# Patient Record
Sex: FEMALE | Race: WHITE | NOT HISPANIC OR LATINO | Employment: OTHER | URBAN - METROPOLITAN AREA
[De-identification: names, ages, dates, MRNs, and addresses within clinical notes are randomized per-mention and may not be internally consistent; named-entity substitution may affect disease eponyms.]

---

## 2017-01-01 ENCOUNTER — TELEPHONE (OUTPATIENT)
Dept: FAMILY MEDICINE | Facility: CLINIC | Age: 77
End: 2017-01-01

## 2017-01-01 ENCOUNTER — LAB VISIT (OUTPATIENT)
Dept: LAB | Facility: HOSPITAL | Age: 77
End: 2017-01-01
Attending: INTERNAL MEDICINE
Payer: MEDICARE

## 2017-01-01 ENCOUNTER — TELEPHONE (OUTPATIENT)
Dept: INFUSION THERAPY | Facility: HOSPITAL | Age: 77
End: 2017-01-01

## 2017-01-01 ENCOUNTER — OFFICE VISIT (OUTPATIENT)
Dept: FAMILY MEDICINE | Facility: CLINIC | Age: 77
End: 2017-01-01
Payer: MEDICARE

## 2017-01-01 ENCOUNTER — TELEPHONE (OUTPATIENT)
Dept: HEMATOLOGY/ONCOLOGY | Facility: CLINIC | Age: 77
End: 2017-01-01

## 2017-01-01 ENCOUNTER — INFUSION (OUTPATIENT)
Dept: INFUSION THERAPY | Facility: HOSPITAL | Age: 77
End: 2017-01-01
Attending: INTERNAL MEDICINE
Payer: MEDICARE

## 2017-01-01 ENCOUNTER — LAB VISIT (OUTPATIENT)
Dept: LAB | Facility: HOSPITAL | Age: 77
End: 2017-01-01
Attending: PHYSICIAN ASSISTANT
Payer: MEDICARE

## 2017-01-01 ENCOUNTER — DOCUMENTATION ONLY (OUTPATIENT)
Dept: INFUSION THERAPY | Facility: HOSPITAL | Age: 77
End: 2017-01-01

## 2017-01-01 ENCOUNTER — DOCUMENTATION ONLY (OUTPATIENT)
Dept: ADMINISTRATIVE | Facility: HOSPITAL | Age: 77
End: 2017-01-01

## 2017-01-01 VITALS
BODY MASS INDEX: 22.2 KG/M2 | WEIGHT: 130 LBS | SYSTOLIC BLOOD PRESSURE: 107 MMHG | DIASTOLIC BLOOD PRESSURE: 65 MMHG | HEART RATE: 97 BPM | HEIGHT: 64 IN | RESPIRATION RATE: 16 BRPM | TEMPERATURE: 97 F

## 2017-01-01 VITALS
HEART RATE: 102 BPM | SYSTOLIC BLOOD PRESSURE: 104 MMHG | DIASTOLIC BLOOD PRESSURE: 59 MMHG | WEIGHT: 125.63 LBS | HEIGHT: 65 IN | BODY MASS INDEX: 20.93 KG/M2 | TEMPERATURE: 98 F | RESPIRATION RATE: 20 BRPM

## 2017-01-01 VITALS
SYSTOLIC BLOOD PRESSURE: 102 MMHG | DIASTOLIC BLOOD PRESSURE: 58 MMHG | RESPIRATION RATE: 16 BRPM | OXYGEN SATURATION: 98 % | WEIGHT: 129.75 LBS | HEART RATE: 100 BPM | BODY MASS INDEX: 21.62 KG/M2 | TEMPERATURE: 98 F | HEIGHT: 65 IN

## 2017-01-01 VITALS
DIASTOLIC BLOOD PRESSURE: 72 MMHG | OXYGEN SATURATION: 96 % | TEMPERATURE: 99 F | BODY MASS INDEX: 21.72 KG/M2 | SYSTOLIC BLOOD PRESSURE: 118 MMHG | RESPIRATION RATE: 20 BRPM | HEIGHT: 66 IN | WEIGHT: 135.13 LBS | HEART RATE: 102 BPM

## 2017-01-01 VITALS
OXYGEN SATURATION: 96 % | WEIGHT: 130.81 LBS | HEART RATE: 95 BPM | DIASTOLIC BLOOD PRESSURE: 72 MMHG | HEIGHT: 64 IN | BODY MASS INDEX: 22.33 KG/M2 | SYSTOLIC BLOOD PRESSURE: 112 MMHG | TEMPERATURE: 98 F

## 2017-01-01 VITALS
HEART RATE: 84 BPM | SYSTOLIC BLOOD PRESSURE: 112 MMHG | BODY MASS INDEX: 20.75 KG/M2 | RESPIRATION RATE: 17 BRPM | DIASTOLIC BLOOD PRESSURE: 78 MMHG | HEIGHT: 68 IN | OXYGEN SATURATION: 98 % | WEIGHT: 136.88 LBS | TEMPERATURE: 98 F

## 2017-01-01 DIAGNOSIS — C50.912: Primary | ICD-10-CM

## 2017-01-01 DIAGNOSIS — C50.919 BREAST CARCINOMA METASTATIC, PLEURA, UNSPECIFIED LATERALITY: ICD-10-CM

## 2017-01-01 DIAGNOSIS — C50.919 BREAST CARCINOMA METASTATIC, PLEURA, UNSPECIFIED LATERALITY: Primary | ICD-10-CM

## 2017-01-01 DIAGNOSIS — C78.2 BREAST CARCINOMA METASTATIC, PLEURA, UNSPECIFIED LATERALITY: ICD-10-CM

## 2017-01-01 DIAGNOSIS — D53.9 MACROCYTIC ANEMIA: ICD-10-CM

## 2017-01-01 DIAGNOSIS — C78.2 BREAST CARCINOMA METASTATIC, PLEURA, UNSPECIFIED LATERALITY: Primary | ICD-10-CM

## 2017-01-01 DIAGNOSIS — R53.83 FATIGUE, UNSPECIFIED TYPE: ICD-10-CM

## 2017-01-01 DIAGNOSIS — J18.9 PNEUMONIA OF LEFT LOWER LOBE DUE TO INFECTIOUS ORGANISM: Primary | ICD-10-CM

## 2017-01-01 DIAGNOSIS — C78.2: Primary | ICD-10-CM

## 2017-01-01 DIAGNOSIS — R05.9 COUGH: ICD-10-CM

## 2017-01-01 DIAGNOSIS — R39.9 UTI SYMPTOMS: Primary | ICD-10-CM

## 2017-01-01 DIAGNOSIS — J40 BRONCHITIS, NOT SPECIFIED AS ACUTE OR CHRONIC: Primary | ICD-10-CM

## 2017-01-01 DIAGNOSIS — E53.8 B12 DEFICIENCY: ICD-10-CM

## 2017-01-01 DIAGNOSIS — J18.9 PNEUMONIA OF LEFT LOWER LOBE DUE TO INFECTIOUS ORGANISM: ICD-10-CM

## 2017-01-01 DIAGNOSIS — J01.00 ACUTE MAXILLARY SINUSITIS, RECURRENCE NOT SPECIFIED: Primary | ICD-10-CM

## 2017-01-01 DIAGNOSIS — R59.0 MEDIASTINAL LYMPHADENOPATHY: ICD-10-CM

## 2017-01-01 LAB
ALBUMIN SERPL BCP-MCNC: 3.1 G/DL
ALBUMIN SERPL BCP-MCNC: 3.2 G/DL
ALBUMIN SERPL BCP-MCNC: 3.2 G/DL
ALBUMIN SERPL BCP-MCNC: 3.3 G/DL
ALBUMIN SERPL BCP-MCNC: 3.7 G/DL
ALBUMIN SERPL BCP-MCNC: 3.8 G/DL
ALBUMIN SERPL BCP-MCNC: 3.9 G/DL
ALBUMIN SERPL BCP-MCNC: 3.9 G/DL
ALBUMIN SERPL BCP-MCNC: 4 G/DL
ALBUMIN SERPL BCP-MCNC: 4.2 G/DL
ALBUMIN SERPL BCP-MCNC: 4.2 G/DL
ALP SERPL-CCNC: 60 U/L
ALP SERPL-CCNC: 63 U/L
ALP SERPL-CCNC: 65 U/L
ALP SERPL-CCNC: 65 U/L
ALP SERPL-CCNC: 67 U/L
ALP SERPL-CCNC: 68 U/L
ALP SERPL-CCNC: 68 U/L
ALP SERPL-CCNC: 69 U/L
ALP SERPL-CCNC: 72 U/L
ALP SERPL-CCNC: 73 U/L
ALP SERPL-CCNC: 74 U/L
ALT SERPL W/O P-5'-P-CCNC: 20 U/L
ALT SERPL W/O P-5'-P-CCNC: 22 U/L
ALT SERPL W/O P-5'-P-CCNC: 23 U/L
ALT SERPL W/O P-5'-P-CCNC: 23 U/L
ALT SERPL W/O P-5'-P-CCNC: 25 U/L
ALT SERPL W/O P-5'-P-CCNC: 27 U/L
ALT SERPL W/O P-5'-P-CCNC: 28 U/L
ALT SERPL W/O P-5'-P-CCNC: 28 U/L
ALT SERPL W/O P-5'-P-CCNC: 29 U/L
ALT SERPL W/O P-5'-P-CCNC: 31 U/L
ALT SERPL W/O P-5'-P-CCNC: 44 U/L
AMYLASE SERPL-CCNC: 76 U/L
ANION GAP SERPL CALC-SCNC: 10 MMOL/L
ANION GAP SERPL CALC-SCNC: 10 MMOL/L
ANION GAP SERPL CALC-SCNC: 5 MMOL/L
ANION GAP SERPL CALC-SCNC: 6 MMOL/L
ANION GAP SERPL CALC-SCNC: 6 MMOL/L
ANION GAP SERPL CALC-SCNC: 7 MMOL/L
ANION GAP SERPL CALC-SCNC: 8 MMOL/L
ANION GAP SERPL CALC-SCNC: 9 MMOL/L
ANISOCYTOSIS BLD QL SMEAR: SLIGHT
AST SERPL-CCNC: 32 U/L
AST SERPL-CCNC: 36 U/L
AST SERPL-CCNC: 38 U/L
AST SERPL-CCNC: 38 U/L
AST SERPL-CCNC: 39 U/L
AST SERPL-CCNC: 40 U/L
AST SERPL-CCNC: 41 U/L
AST SERPL-CCNC: 41 U/L
AST SERPL-CCNC: 42 U/L
AST SERPL-CCNC: 45 U/L
AST SERPL-CCNC: 51 U/L
BASOPHILS # BLD AUTO: 0.04 K/UL
BASOPHILS # BLD AUTO: 0.05 K/UL
BASOPHILS # BLD AUTO: 0.06 K/UL
BASOPHILS # BLD AUTO: 0.07 K/UL
BASOPHILS # BLD AUTO: 0.07 K/UL
BASOPHILS # BLD AUTO: 0.09 K/UL
BASOPHILS NFR BLD: 0 %
BASOPHILS NFR BLD: 0.4 %
BASOPHILS NFR BLD: 0.5 %
BASOPHILS NFR BLD: 1.2 %
BASOPHILS NFR BLD: 2 %
BASOPHILS NFR BLD: 2 %
BASOPHILS NFR BLD: 2.2 %
BASOPHILS NFR BLD: 2.3 %
BASOPHILS NFR BLD: 2.8 %
BILIRUB SERPL-MCNC: 0.3 MG/DL
BILIRUB SERPL-MCNC: 0.4 MG/DL
BILIRUB SERPL-MCNC: 0.5 MG/DL
BILIRUB SERPL-MCNC: 0.5 MG/DL
BILIRUB SERPL-MCNC: 0.6 MG/DL
BILIRUB SERPL-MCNC: 0.7 MG/DL
BILIRUB SERPL-MCNC: NORMAL MG/DL
BLOOD URINE, POC: NORMAL
BUN SERPL-MCNC: 10 MG/DL
BUN SERPL-MCNC: 10 MG/DL
BUN SERPL-MCNC: 11 MG/DL
BUN SERPL-MCNC: 12 MG/DL
BUN SERPL-MCNC: 13 MG/DL
BUN SERPL-MCNC: 16 MG/DL
BUN SERPL-MCNC: 16 MG/DL
BUN SERPL-MCNC: 19 MG/DL
BUN SERPL-MCNC: 8 MG/DL
CALCIUM SERPL-MCNC: 8.4 MG/DL
CALCIUM SERPL-MCNC: 8.5 MG/DL
CALCIUM SERPL-MCNC: 8.5 MG/DL
CALCIUM SERPL-MCNC: 9 MG/DL
CALCIUM SERPL-MCNC: 9 MG/DL
CALCIUM SERPL-MCNC: 9.2 MG/DL
CALCIUM SERPL-MCNC: 9.2 MG/DL
CALCIUM SERPL-MCNC: 9.4 MG/DL
CALCIUM SERPL-MCNC: 9.4 MG/DL
CALCIUM SERPL-MCNC: 9.5 MG/DL
CALCIUM SERPL-MCNC: 9.5 MG/DL
CANCER AG15-3 SERPL-ACNC: 464 U/ML
CANCER AG27-29 SERPL-ACNC: 382 U/ML
CEA SERPL-MCNC: 121 NG/ML
CHLORIDE SERPL-SCNC: 100 MMOL/L
CHLORIDE SERPL-SCNC: 101 MMOL/L
CHLORIDE SERPL-SCNC: 102 MMOL/L
CHLORIDE SERPL-SCNC: 103 MMOL/L
CHLORIDE SERPL-SCNC: 95 MMOL/L
CHLORIDE SERPL-SCNC: 95 MMOL/L
CHLORIDE SERPL-SCNC: 96 MMOL/L
CHLORIDE SERPL-SCNC: 96 MMOL/L
CHLORIDE SERPL-SCNC: 98 MMOL/L
CHLORIDE SERPL-SCNC: 98 MMOL/L
CHLORIDE SERPL-SCNC: 99 MMOL/L
CO2 SERPL-SCNC: 27 MMOL/L
CO2 SERPL-SCNC: 29 MMOL/L
CO2 SERPL-SCNC: 30 MMOL/L
CO2 SERPL-SCNC: 30 MMOL/L
CO2 SERPL-SCNC: 31 MMOL/L
CO2 SERPL-SCNC: 32 MMOL/L
COLOR, POC UA: YELLOW
CREAT SERPL-MCNC: 0.64 MG/DL
CREAT SERPL-MCNC: 0.64 MG/DL
CREAT SERPL-MCNC: 0.65 MG/DL
CREAT SERPL-MCNC: 0.66 MG/DL
CREAT SERPL-MCNC: 0.67 MG/DL
CREAT SERPL-MCNC: 0.69 MG/DL
CREAT SERPL-MCNC: 0.7 MG/DL
CREAT SERPL-MCNC: 0.7 MG/DL
CREAT SERPL-MCNC: 0.71 MG/DL
CREAT SERPL-MCNC: 0.73 MG/DL
CREAT SERPL-MCNC: 0.8 MG/DL
DIFFERENTIAL METHOD: ABNORMAL
EOSINOPHIL # BLD AUTO: 0 K/UL
EOSINOPHIL # BLD AUTO: 0.1 K/UL
EOSINOPHIL NFR BLD: 0 %
EOSINOPHIL NFR BLD: 0.3 %
EOSINOPHIL NFR BLD: 0.3 %
EOSINOPHIL NFR BLD: 0.4 %
EOSINOPHIL NFR BLD: 0.6 %
EOSINOPHIL NFR BLD: 0.7 %
EOSINOPHIL NFR BLD: 0.9 %
EOSINOPHIL NFR BLD: 1 %
EOSINOPHIL NFR BLD: 1 %
ERYTHROCYTE [DISTWIDTH] IN BLOOD BY AUTOMATED COUNT: 14 %
ERYTHROCYTE [DISTWIDTH] IN BLOOD BY AUTOMATED COUNT: 14.1 %
ERYTHROCYTE [DISTWIDTH] IN BLOOD BY AUTOMATED COUNT: 14.2 %
ERYTHROCYTE [DISTWIDTH] IN BLOOD BY AUTOMATED COUNT: 14.6 %
ERYTHROCYTE [DISTWIDTH] IN BLOOD BY AUTOMATED COUNT: 14.9 %
ERYTHROCYTE [DISTWIDTH] IN BLOOD BY AUTOMATED COUNT: 15.4 %
ERYTHROCYTE [DISTWIDTH] IN BLOOD BY AUTOMATED COUNT: 15.6 %
ERYTHROCYTE [DISTWIDTH] IN BLOOD BY AUTOMATED COUNT: 16.3 %
ERYTHROCYTE [DISTWIDTH] IN BLOOD BY AUTOMATED COUNT: 17.8 %
EST. GFR  (AFRICAN AMERICAN): >60 ML/MIN/1.73 M^2
EST. GFR  (NON AFRICAN AMERICAN): >60 ML/MIN/1.73 M^2
FOLATE SERPL-MCNC: 17.3 NG/ML
GIANT PLATELETS BLD QL SMEAR: PRESENT
GIANT PLATELETS BLD QL SMEAR: PRESENT
GLUCOSE SERPL-MCNC: 100 MG/DL
GLUCOSE SERPL-MCNC: 100 MG/DL
GLUCOSE SERPL-MCNC: 103 MG/DL
GLUCOSE SERPL-MCNC: 105 MG/DL
GLUCOSE SERPL-MCNC: 105 MG/DL
GLUCOSE SERPL-MCNC: 107 MG/DL
GLUCOSE SERPL-MCNC: 112 MG/DL
GLUCOSE SERPL-MCNC: 77 MG/DL
GLUCOSE SERPL-MCNC: 87 MG/DL
GLUCOSE SERPL-MCNC: 87 MG/DL
GLUCOSE SERPL-MCNC: 90 MG/DL
GLUCOSE UR QL STRIP: NORMAL
HCT VFR BLD AUTO: 29.6 %
HCT VFR BLD AUTO: 31 %
HCT VFR BLD AUTO: 31 %
HCT VFR BLD AUTO: 32.5 %
HCT VFR BLD AUTO: 32.5 %
HCT VFR BLD AUTO: 33 %
HCT VFR BLD AUTO: 36.5 %
HCT VFR BLD AUTO: 36.6 %
HCT VFR BLD AUTO: 37.6 %
HGB BLD-MCNC: 10.2 G/DL
HGB BLD-MCNC: 10.5 G/DL
HGB BLD-MCNC: 10.5 G/DL
HGB BLD-MCNC: 10.6 G/DL
HGB BLD-MCNC: 11.5 G/DL
HGB BLD-MCNC: 11.7 G/DL
HGB BLD-MCNC: 11.9 G/DL
HGB BLD-MCNC: 9.6 G/DL
HGB BLD-MCNC: 9.9 G/DL
HYPOCHROMIA BLD QL SMEAR: ABNORMAL
IGA SERPL-MCNC: 270 MG/DL
IGG SERPL-MCNC: 734 MG/DL
IGM SERPL-MCNC: 49 MG/DL
KETONES UR QL STRIP: NORMAL
LEUKOCYTE ESTERASE URINE, POC: NORMAL
LIPASE SERPL-CCNC: 113 U/L
LYMPHOCYTES # BLD AUTO: 0.5 K/UL
LYMPHOCYTES # BLD AUTO: 0.6 K/UL
LYMPHOCYTES # BLD AUTO: 0.6 K/UL
LYMPHOCYTES # BLD AUTO: 1 K/UL
LYMPHOCYTES # BLD AUTO: 1.3 K/UL
LYMPHOCYTES # BLD AUTO: 1.6 K/UL
LYMPHOCYTES NFR BLD: 11 %
LYMPHOCYTES NFR BLD: 12.2 %
LYMPHOCYTES NFR BLD: 13 %
LYMPHOCYTES NFR BLD: 15.4 %
LYMPHOCYTES NFR BLD: 17.8 %
LYMPHOCYTES NFR BLD: 19.6 %
LYMPHOCYTES NFR BLD: 23 %
LYMPHOCYTES NFR BLD: 23.1 %
LYMPHOCYTES NFR BLD: 35 %
MAGNESIUM SERPL-MCNC: 2 MG/DL
MAGNESIUM SERPL-MCNC: 2.1 MG/DL
MAGNESIUM SERPL-MCNC: 2.1 MG/DL
MAGNESIUM SERPL-MCNC: 2.2 MG/DL
MAGNESIUM SERPL-MCNC: 2.3 MG/DL
MCH RBC QN AUTO: 32.9 PG
MCH RBC QN AUTO: 33 PG
MCH RBC QN AUTO: 33.1 PG
MCH RBC QN AUTO: 33.1 PG
MCH RBC QN AUTO: 33.8 PG
MCH RBC QN AUTO: 34.3 PG
MCH RBC QN AUTO: 37.7 PG
MCH RBC QN AUTO: 38 PG
MCH RBC QN AUTO: 38.5 PG
MCHC RBC AUTO-ENTMCNC: 31.5 %
MCHC RBC AUTO-ENTMCNC: 31.6 %
MCHC RBC AUTO-ENTMCNC: 31.8 %
MCHC RBC AUTO-ENTMCNC: 31.9 %
MCHC RBC AUTO-ENTMCNC: 32 %
MCHC RBC AUTO-ENTMCNC: 32.3 %
MCHC RBC AUTO-ENTMCNC: 32.4 %
MCHC RBC AUTO-ENTMCNC: 32.6 %
MCHC RBC AUTO-ENTMCNC: 32.9 %
MCV RBC AUTO: 102 FL
MCV RBC AUTO: 103 FL
MCV RBC AUTO: 104 FL
MCV RBC AUTO: 105 FL
MCV RBC AUTO: 107 FL
MCV RBC AUTO: 107 FL
MCV RBC AUTO: 116 FL
MCV RBC AUTO: 117 FL
MCV RBC AUTO: 118 FL
METHYLMALONATE SERPL-SCNC: 0.17 UMOL/L
MONOCYTES # BLD AUTO: 0.2 K/UL
MONOCYTES # BLD AUTO: 0.3 K/UL
MONOCYTES # BLD AUTO: 0.4 K/UL
MONOCYTES # BLD AUTO: 1 K/UL
MONOCYTES # BLD AUTO: 1.1 K/UL
MONOCYTES # BLD AUTO: 1.4 K/UL
MONOCYTES NFR BLD: 13.1 %
MONOCYTES NFR BLD: 13.3 %
MONOCYTES NFR BLD: 13.4 %
MONOCYTES NFR BLD: 4 %
MONOCYTES NFR BLD: 4 %
MONOCYTES NFR BLD: 5 %
MONOCYTES NFR BLD: 9 %
MONOCYTES NFR BLD: 9.9 %
MONOCYTES NFR BLD: 9.9 %
NEUTROPHILS # BLD AUTO: 1.2 K/UL
NEUTROPHILS # BLD AUTO: 1.3 K/UL
NEUTROPHILS # BLD AUTO: 1.4 K/UL
NEUTROPHILS # BLD AUTO: 2.1 K/UL
NEUTROPHILS # BLD AUTO: 2.1 K/UL
NEUTROPHILS # BLD AUTO: 5 K/UL
NEUTROPHILS # BLD AUTO: 5.3 K/UL
NEUTROPHILS # BLD AUTO: 7.3 K/UL
NEUTROPHILS # BLD AUTO: 8.4 K/UL
NEUTROPHILS NFR BLD: 59 %
NEUTROPHILS NFR BLD: 63.3 %
NEUTROPHILS NFR BLD: 66.6 %
NEUTROPHILS NFR BLD: 68.4 %
NEUTROPHILS NFR BLD: 69 %
NEUTROPHILS NFR BLD: 70.6 %
NEUTROPHILS NFR BLD: 72 %
NEUTROPHILS NFR BLD: 76.8 %
NEUTROPHILS NFR BLD: 84 %
NITRITE, POC UA: NORMAL
NRBC BLD-RTO: 0 /100 WBC
OVALOCYTES BLD QL SMEAR: ABNORMAL
OVALOCYTES BLD QL SMEAR: ABNORMAL
PH, POC UA: 6
PLATELET # BLD AUTO: 154 K/UL
PLATELET # BLD AUTO: 221 K/UL
PLATELET # BLD AUTO: 226 K/UL
PLATELET # BLD AUTO: 298 K/UL
PLATELET # BLD AUTO: 303 K/UL
PLATELET # BLD AUTO: 318 K/UL
PLATELET # BLD AUTO: 327 K/UL
PLATELET # BLD AUTO: 339 K/UL
PLATELET # BLD AUTO: 364 K/UL
PLATELET BLD QL SMEAR: ABNORMAL
PMV BLD AUTO: 10.1 FL
PMV BLD AUTO: 10.3 FL
PMV BLD AUTO: 8.8 FL
PMV BLD AUTO: 9.1 FL
PMV BLD AUTO: 9.1 FL
PMV BLD AUTO: 9.2 FL
PMV BLD AUTO: 9.3 FL
PMV BLD AUTO: 9.5 FL
PMV BLD AUTO: 9.7 FL
POIKILOCYTOSIS BLD QL SMEAR: SLIGHT
POIKILOCYTOSIS BLD QL SMEAR: SLIGHT
POLYCHROMASIA BLD QL SMEAR: ABNORMAL
POTASSIUM SERPL-SCNC: 3 MMOL/L
POTASSIUM SERPL-SCNC: 3.3 MMOL/L
POTASSIUM SERPL-SCNC: 3.4 MMOL/L
POTASSIUM SERPL-SCNC: 3.6 MMOL/L
POTASSIUM SERPL-SCNC: 3.8 MMOL/L
POTASSIUM SERPL-SCNC: 3.9 MMOL/L
POTASSIUM SERPL-SCNC: 3.9 MMOL/L
POTASSIUM SERPL-SCNC: 4 MMOL/L
POTASSIUM SERPL-SCNC: 4 MMOL/L
POTASSIUM SERPL-SCNC: 4.1 MMOL/L
POTASSIUM SERPL-SCNC: 4.4 MMOL/L
PROT SERPL-MCNC: 5.8 G/DL
PROT SERPL-MCNC: 5.9 G/DL
PROT SERPL-MCNC: 5.9 G/DL
PROT SERPL-MCNC: 6 G/DL
PROT SERPL-MCNC: 6.4 G/DL
PROT SERPL-MCNC: 6.6 G/DL
PROT SERPL-MCNC: 6.8 G/DL
PROT SERPL-MCNC: 6.8 G/DL
PROT SERPL-MCNC: 7 G/DL
PROT SERPL-MCNC: 7.3 G/DL
PROT SERPL-MCNC: 7.4 G/DL
PROTEIN, POC: NORMAL
RBC # BLD AUTO: 2.65 M/UL
RBC # BLD AUTO: 2.76 M/UL
RBC # BLD AUTO: 2.81 M/UL
RBC # BLD AUTO: 2.91 M/UL
RBC # BLD AUTO: 2.99 M/UL
RBC # BLD AUTO: 3.19 M/UL
RBC # BLD AUTO: 3.41 M/UL
RBC # BLD AUTO: 3.47 M/UL
RBC # BLD AUTO: 3.52 M/UL
SODIUM SERPL-SCNC: 132 MMOL/L
SODIUM SERPL-SCNC: 133 MMOL/L
SODIUM SERPL-SCNC: 134 MMOL/L
SODIUM SERPL-SCNC: 137 MMOL/L
SODIUM SERPL-SCNC: 137 MMOL/L
SODIUM SERPL-SCNC: 139 MMOL/L
SODIUM SERPL-SCNC: 139 MMOL/L
SODIUM SERPL-SCNC: 141 MMOL/L
SODIUM SERPL-SCNC: 141 MMOL/L
SPECIFIC GRAVITY, POC UA: 1.01
UROBILINOGEN, POC UA: NORMAL
VIT B12 SERPL-MCNC: 813 PG/ML
WBC # BLD AUTO: 1.96 K/UL
WBC # BLD AUTO: 1.97 K/UL
WBC # BLD AUTO: 10.35 K/UL
WBC # BLD AUTO: 10.89 K/UL
WBC # BLD AUTO: 2.12 K/UL
WBC # BLD AUTO: 3.01 K/UL
WBC # BLD AUTO: 3.2 K/UL
WBC # BLD AUTO: 5.97 K/UL
WBC # BLD AUTO: 7.37 K/UL

## 2017-01-01 PROCEDURE — 80053 COMPREHEN METABOLIC PANEL: CPT

## 2017-01-01 PROCEDURE — 83735 ASSAY OF MAGNESIUM: CPT | Mod: PN

## 2017-01-01 PROCEDURE — 96372 THER/PROPH/DIAG INJ SC/IM: CPT | Mod: PN

## 2017-01-01 PROCEDURE — 85025 COMPLETE CBC W/AUTO DIFF WBC: CPT

## 2017-01-01 PROCEDURE — 36415 COLL VENOUS BLD VENIPUNCTURE: CPT | Mod: PN

## 2017-01-01 PROCEDURE — 1126F AMNT PAIN NOTED NONE PRSNT: CPT | Mod: S$GLB,,, | Performed by: NURSE PRACTITIONER

## 2017-01-01 PROCEDURE — 86300 IMMUNOASSAY TUMOR CA 15-3: CPT | Mod: PN

## 2017-01-01 PROCEDURE — 85025 COMPLETE CBC W/AUTO DIFF WBC: CPT | Mod: PN

## 2017-01-01 PROCEDURE — 85027 COMPLETE CBC AUTOMATED: CPT

## 2017-01-01 PROCEDURE — 82378 CARCINOEMBRYONIC ANTIGEN: CPT

## 2017-01-01 PROCEDURE — 1159F MED LIST DOCD IN RCRD: CPT | Mod: S$GLB,,, | Performed by: NURSE PRACTITIONER

## 2017-01-01 PROCEDURE — 85007 BL SMEAR W/DIFF WBC COUNT: CPT | Mod: PN

## 2017-01-01 PROCEDURE — 80053 COMPREHEN METABOLIC PANEL: CPT | Mod: PN

## 2017-01-01 PROCEDURE — 83735 ASSAY OF MAGNESIUM: CPT

## 2017-01-01 PROCEDURE — 96402 CHEMO HORMON ANTINEOPL SQ/IM: CPT | Mod: PN

## 2017-01-01 PROCEDURE — 99214 OFFICE O/P EST MOD 30 MIN: CPT | Mod: S$GLB,,, | Performed by: NURSE PRACTITIONER

## 2017-01-01 PROCEDURE — 96372 THER/PROPH/DIAG INJ SC/IM: CPT | Mod: S$GLB,,, | Performed by: NURSE PRACTITIONER

## 2017-01-01 PROCEDURE — 82784 ASSAY IGA/IGD/IGG/IGM EACH: CPT | Mod: 59

## 2017-01-01 PROCEDURE — 63600175 PHARM REV CODE 636 W HCPCS: Mod: PN | Performed by: INTERNAL MEDICINE

## 2017-01-01 PROCEDURE — 1160F RVW MEDS BY RX/DR IN RCRD: CPT | Mod: S$GLB,,, | Performed by: NURSE PRACTITIONER

## 2017-01-01 PROCEDURE — 99214 OFFICE O/P EST MOD 30 MIN: CPT | Mod: 25,S$GLB,, | Performed by: NURSE PRACTITIONER

## 2017-01-01 PROCEDURE — 1157F ADVNC CARE PLAN IN RCRD: CPT | Mod: S$GLB,,, | Performed by: NURSE PRACTITIONER

## 2017-01-01 PROCEDURE — 83690 ASSAY OF LIPASE: CPT | Mod: PN

## 2017-01-01 PROCEDURE — 82150 ASSAY OF AMYLASE: CPT | Mod: PN

## 2017-01-01 PROCEDURE — 82150 ASSAY OF AMYLASE: CPT

## 2017-01-01 PROCEDURE — 85007 BL SMEAR W/DIFF WBC COUNT: CPT

## 2017-01-01 PROCEDURE — 82378 CARCINOEMBRYONIC ANTIGEN: CPT | Mod: PN

## 2017-01-01 PROCEDURE — 80053 COMPREHEN METABOLIC PANEL: CPT | Mod: 91

## 2017-01-01 PROCEDURE — 85027 COMPLETE CBC AUTOMATED: CPT | Mod: PN

## 2017-01-01 PROCEDURE — 63600175 PHARM REV CODE 636 W HCPCS: Mod: PN | Performed by: NURSE PRACTITIONER

## 2017-01-01 PROCEDURE — 99499 UNLISTED E&M SERVICE: CPT | Mod: S$GLB,,, | Performed by: NURSE PRACTITIONER

## 2017-01-01 PROCEDURE — 82607 VITAMIN B-12: CPT | Mod: PN

## 2017-01-01 PROCEDURE — 82746 ASSAY OF FOLIC ACID SERUM: CPT | Mod: PN

## 2017-01-01 PROCEDURE — 83690 ASSAY OF LIPASE: CPT

## 2017-01-01 PROCEDURE — 86300 IMMUNOASSAY TUMOR CA 15-3: CPT | Mod: 91

## 2017-01-01 PROCEDURE — 99213 OFFICE O/P EST LOW 20 MIN: CPT | Mod: 25,S$GLB,, | Performed by: NURSE PRACTITIONER

## 2017-01-01 PROCEDURE — 80053 COMPREHEN METABOLIC PANEL: CPT | Mod: 91,PN

## 2017-01-01 PROCEDURE — 82607 VITAMIN B-12: CPT

## 2017-01-01 PROCEDURE — 82746 ASSAY OF FOLIC ACID SERUM: CPT

## 2017-01-01 PROCEDURE — 86300 IMMUNOASSAY TUMOR CA 15-3: CPT

## 2017-01-01 PROCEDURE — 83921 ORGANIC ACID SINGLE QUANT: CPT

## 2017-01-01 PROCEDURE — 81002 URINALYSIS NONAUTO W/O SCOPE: CPT | Mod: S$GLB,,, | Performed by: NURSE PRACTITIONER

## 2017-01-01 RX ORDER — ALBUTEROL SULFATE 1.25 MG/3ML
1.25 SOLUTION RESPIRATORY (INHALATION) EVERY 6 HOURS PRN
Qty: 30 ML | Refills: 1 | Status: SHIPPED | OUTPATIENT
Start: 2017-01-01 | End: 2017-01-01 | Stop reason: CLARIF

## 2017-01-01 RX ORDER — LEVOFLOXACIN 500 MG/1
500 TABLET, FILM COATED ORAL DAILY
COMMUNITY
End: 2017-01-01

## 2017-01-01 RX ORDER — LEVOFLOXACIN 500 MG/1
500 TABLET, FILM COATED ORAL DAILY
Qty: 7 TABLET | Refills: 0 | Status: SHIPPED | OUTPATIENT
Start: 2017-01-01 | End: 2017-01-01

## 2017-01-01 RX ORDER — SODIUM CHLORIDE FOR INHALATION 7 %
VIAL, NEBULIZER (ML) INHALATION
Refills: 0 | COMMUNITY
Start: 2017-01-01 | End: 2017-01-01

## 2017-01-01 RX ORDER — CYANOCOBALAMIN 1000 UG/ML
1000 INJECTION, SOLUTION INTRAMUSCULAR; SUBCUTANEOUS
Status: COMPLETED | OUTPATIENT
Start: 2017-01-01 | End: 2017-01-01

## 2017-01-01 RX ORDER — BUDESONIDE 0.25 MG/2ML
1 INHALANT ORAL
Refills: 3 | COMMUNITY
Start: 2017-01-01 | End: 2017-01-01

## 2017-01-01 RX ORDER — AZITHROMYCIN 250 MG/1
250 TABLET, FILM COATED ORAL DAILY
Qty: 6 TABLET | Refills: 0 | Status: SHIPPED | OUTPATIENT
Start: 2017-01-01 | End: 2017-01-01

## 2017-01-01 RX ORDER — FLUTICASONE PROPIONATE 50 MCG
SPRAY, SUSPENSION (ML) NASAL
Qty: 1 BOTTLE | Refills: 11 | Status: SHIPPED | OUTPATIENT
Start: 2017-01-01 | End: 2017-01-01

## 2017-01-01 RX ORDER — TRIAMCINOLONE ACETONIDE 40 MG/ML
40 INJECTION, SUSPENSION INTRA-ARTICULAR; INTRAMUSCULAR
Status: COMPLETED | OUTPATIENT
Start: 2017-01-01 | End: 2017-01-01

## 2017-01-01 RX ORDER — PREDNISONE 20 MG/1
20 TABLET ORAL DAILY
Qty: 10 TABLET | Refills: 0 | Status: SHIPPED | OUTPATIENT
Start: 2017-01-01 | End: 2017-01-01

## 2017-01-01 RX ORDER — LAMOTRIGINE 25 MG/1
500 TABLET ORAL
Status: COMPLETED | OUTPATIENT
Start: 2017-01-01 | End: 2017-01-01

## 2017-01-01 RX ORDER — CYANOCOBALAMIN 1000 UG/ML
100 INJECTION, SOLUTION INTRAMUSCULAR; SUBCUTANEOUS
Status: COMPLETED | OUTPATIENT
Start: 2017-01-01 | End: 2017-01-01

## 2017-01-01 RX ORDER — LETROZOLE 2.5 MG/1
2.5 TABLET, FILM COATED ORAL DAILY
COMMUNITY
End: 2017-01-01 | Stop reason: SDUPTHER

## 2017-01-01 RX ORDER — TIZANIDINE 4 MG/1
TABLET ORAL
Qty: 240 ML | Refills: 0 | Status: SHIPPED | OUTPATIENT
Start: 2017-01-01

## 2017-01-01 RX ADMIN — FULVESTRANT 500 MG: 50 INJECTION INTRAMUSCULAR at 12:06

## 2017-01-01 RX ADMIN — CYANOCOBALAMIN 1000 MCG: 1000 INJECTION, SOLUTION INTRAMUSCULAR; SUBCUTANEOUS at 09:05

## 2017-01-01 RX ADMIN — CYANOCOBALAMIN 1000 MCG: 1000 INJECTION, SOLUTION INTRAMUSCULAR; SUBCUTANEOUS at 04:07

## 2017-01-01 RX ADMIN — FULVESTRANT 500 MG: 50 INJECTION INTRAMUSCULAR at 11:05

## 2017-01-01 RX ADMIN — CYANOCOBALAMIN 1000 MCG: 1000 INJECTION, SOLUTION INTRAMUSCULAR; SUBCUTANEOUS at 03:03

## 2017-01-01 RX ADMIN — TRIAMCINOLONE ACETONIDE 40 MG: 40 INJECTION, SUSPENSION INTRA-ARTICULAR; INTRAMUSCULAR at 03:03

## 2017-01-01 RX ADMIN — FULVESTRANT 500 MG: 50 INJECTION INTRAMUSCULAR at 11:06

## 2017-01-01 RX ADMIN — CYANOCOBALAMIN 100 MCG: 1000 INJECTION, SOLUTION INTRAMUSCULAR; SUBCUTANEOUS at 04:07

## 2017-02-03 NOTE — MR AVS SNAPSHOT
[facility]  [address] [facility] [address]  [facility] [address]  Phone: [phone]  Fax: [phone]                  Alexsandra Doe   2/3/2017 3:20 PM   Office Visit    Description:  Female : 1940   Provider:  Sirisha Wooten NP   Department:  [facility]           Reason for Visit     Cough                To Do List           Future Appointments        Provider Department Dept Phone    3/1/2017 8:30 AM LAB, [facility] [phone]      Goals (5 Years of Data)     None       These Medications        Disp Refills Start End    azithromycin (Z-TANGELA) 250 MG tablet 6 tablet 0 2/3/2017 2017    Take 1 tablet (250 mg total) by mouth once daily. Take 2 tablets on day 1, then one tablet on days 2-5. - Oral    Pharmacy: [facility] [address] [place] [address] [facility] [address] [facility] [address] Ph #: [phone]         [facility] On Call     [facility] On Call Nurse Care Line -  Assistance  Registered nurses in the [facility] On Call Center provide clinical advisement, health education, appointment booking, and other advisory services.  Call for this free service at [phone].             Medications           Message regarding Medications     Verify the changes and/or additions to your medication regime listed below are the same as discussed with your clinician today.  If any of these changes or additions are incorrect, please notify your healthcare provider.        START taking these NEW medications        Refills    azithromycin (Z-TANGELA) 250 MG tablet 0    Sig: Take 1 tablet (250 mg total) by mouth once daily. Take 2 tablets on day 1, then one tablet on days 2-5.    Class: Normal    Route: Oral      STOP taking these medications     levoFLOXacin (LEVAQUIN) 500 MG tablet Take 500 mg by mouth once daily.           Verify that the below list of medications is an accurate representation of the medications  "you are currently taking.  If none reported, the list may be blank. If incorrect, please contact your healthcare provider. Carry this list with you in case of emergency.           Current Medications     gentamicin (GARAMYCIN) 0.1 % ointment Apply topically 3 (three) times daily.    letrozole (FEMARA) 2.5 mg Tab Take 2.5 mg by mouth once daily.    palbociclib (IBRANCE) 125 mg Cap Take 125 mg by mouth Daily. TAKE ONE CAPSULE DAILY FOR 3 WEEKS ON, THEN ONE WEEK OFF    poly-ureaurethane 16 % NFSo Apply 1 application topically once daily.    azithromycin (Z-TANGELA) 250 MG tablet Take 1 tablet (250 mg total) by mouth once daily. Take 2 tablets on day 1, then one tablet on days 2-5.           Clinical Reference Information           Your Vitals Were     BP Pulse Temp Resp Height Weight    112/78 (BP Location: Right arm, Patient Position: Sitting, BP Method: Manual) 84 98.3 °F (36.8 °C) (Oral) 17 5' 8" (1.727 m) 62.1 kg (136 lb 14.5 oz)    Last Period SpO2 BMI          (LMP Unknown) 98% 20.82 kg/m2        Blood Pressure          Most Recent Value    BP  112/78      Allergies as of 2/3/2017     Adhesive    Allegra [Fexofenadine]    Cefuroxime Axetil    Cephalosporins    Ciprofloxacin    Clarinex [Desloratadine]    Clarithromycin    Doxycycline Calcium    Erythromycin    Levocetirizine    Meclizine    Moxifloxacin    Nitrofurantoin Monohyd/m-cryst    Omeprazole    Penicillins    Scopace [Scopolamine Hbr]    Sulfa (Sulfonamide Antibiotics)    Zyrtec-d [Cetirizine-pseudoephedrine]      Immunizations Administered on Date of Encounter - 2/3/2017     None      Language Assistance Services     ATTENTION: Language assistance services are available, free of charge. Please call [phone].      ATENCIÓN: Si michaella tigre, tiene a lee disposición servicios gratuitos de asistencia lingüística. Llame al [phone].     CHÚ Ý: N?u b?n nói Ti?ng Vi?t, có các d?ch v? h? tr? ngôn ng? mi?n phí dành cho b?n. G?i s? [phone].         " [facility] complies with applicable Federal civil rights laws and does not discriminate on the basis of race, color, national origin, age, disability, or sex.

## 2017-02-03 NOTE — PROGRESS NOTES
"Subjective:       Patient ID: Alexsandra Doe is a 76 y.o. female.    Chief Complaint: Cough (month)    URI    This is a new problem. Episode onset: one month. Associated symptoms include congestion, coughing, a plugged ear sensation, rhinorrhea, sneezing and swollen glands. Pertinent negatives include no abdominal pain, diarrhea, dysuria, ear pain or headaches. She has tried antihistamine, NSAIDs and increased fluids for the symptoms. The treatment provided no relief.     Review of Systems   Constitutional: Negative for appetite change, chills and fever.   HENT: Positive for congestion, postnasal drip, rhinorrhea, sinus pressure and sneezing. Negative for ear pain.    Eyes: Negative for pain and itching.   Respiratory: Positive for cough and chest tightness. Negative for shortness of breath.    Gastrointestinal: Negative for abdominal distention, abdominal pain and diarrhea.   Endocrine: Negative for polydipsia and polyuria.   Genitourinary: Negative for difficulty urinating, dysuria and flank pain.   Skin: Negative for color change and pallor.   Neurological: Negative for light-headedness and headaches.   Hematological: Negative for adenopathy. Does not bruise/bleed easily.   Psychiatric/Behavioral: Negative for agitation.       Objective:       Vitals:    02/03/17 1511   BP: 112/78   Pulse: 84   Resp: 17   Temp: 98.3 °F (36.8 °C)   TempSrc: Oral   SpO2: 98%   Weight: 62.1 kg (136 lb 14.5 oz)   Height: 5' 8" (1.727 m)   PainSc: 0-No pain       Physical Exam   Constitutional: She is oriented to person, place, and time. She appears well-developed and well-nourished.   HENT:   Head: Normocephalic and atraumatic.   Right Ear: External ear normal. Tympanic membrane mobility is abnormal.   Left Ear: External ear normal. Tympanic membrane mobility is abnormal.   Nose: Mucosal edema and rhinorrhea present.   Mouth/Throat: Posterior oropharyngeal edema present. No posterior oropharyngeal erythema.   Eyes: Conjunctivae are " normal. Pupils are equal, round, and reactive to light.   Neck: Normal range of motion. Neck supple. No tracheal deviation present.   Cardiovascular: Normal rate and regular rhythm.  Exam reveals no gallop and no friction rub.    No murmur heard.  Pulmonary/Chest: Effort normal and breath sounds normal. No stridor. No respiratory distress. She has no wheezes. She has no rales.   Musculoskeletal: Normal range of motion.   Lymphadenopathy:     She has no cervical adenopathy.   Neurological: She is alert and oriented to person, place, and time.   Skin: Skin is warm and dry.   Psychiatric: She has a normal mood and affect. Her behavior is normal. Judgment and thought content normal.       Assessment:       1. Bronchitis, not specified as acute or chronic        Plan:       Alexsandra was seen today for cough.    Diagnoses and all orders for this visit:    Bronchitis, not specified as acute or chronic-pt has had symptoms for 4 weeks.   She is extremely intolerant/ALLERGIC to many antibiotics.  We'll give a course of azithromycin.  -     azithromycin (Z-TANGELA) 250 MG tablet; Take 1 tablet (250 mg total) by mouth once daily. Take 2 tablets on day 1, then one tablet on days 2-5.

## 2017-02-06 NOTE — TELEPHONE ENCOUNTER
Pt stated her rx was discontinued but was informed by you that you would renew again when she was ready. Pended med refill request. Please advise.

## 2017-02-06 NOTE — TELEPHONE ENCOUNTER
----- Message from Ingrid House sent at 2/6/2017 10:34 AM CST -----  Contact: patient  Patient calling in regards to her requesting a script for the Albuterol inhaler solution. Please advise.  Call back .  Thanks!  [employer] [address] [facility] [id number] [place] [address] AT [facility] OF [facility] [address]  [id number] [place] [address]  Phone: [phone] Fax: [phone]

## 2017-03-10 NOTE — PROGRESS NOTES
PRE-VISIT CHART REVIEW    Appointment Scheduled on 3/13/17    Department stratifications & guidelines reviewed:yes    Target Chronic Diagnosis: None    Chronic Diagnosis Intervention Due: no    Goals Updated:N/A    Health Maintenance Due   Topic Date Due    Zoster Vaccine  09/05/2001    Pneumococcal (65+) (1 of 2 - PCV13) 09/05/2006    DEXA SCAN  02/21/2017       Advanced Directives:   65 years of age or older?  Yes  Directive on file?  no                                      Pre-visit patient communication:  In Person    Studies or screenings scheduled pre-visit: no

## 2017-03-13 NOTE — PROGRESS NOTES
"Subjective:       Patient ID: Alexsandra Doe is a 76 y.o. female.    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 The current episode started 1 to 4 weeks ago. The maximum temperature recorded prior to her arrival was 100.4 - 100.9 F. The fever has been present for 1 to 2 days. Associated symptoms include congestion, coughing, headaches, rhinorrhea, sinus pain, sneezing and a sore throat. Pertinent negatives include no abdominal pain, chest pain, diarrhea, dysuria, nausea or vomiting. Associated symptoms comments: Nagging cough, getting worse.     Review of Systems   Constitutional: Positive for appetite change and fatigue.   HENT: Positive for congestion, postnasal drip, rhinorrhea, sinus pressure, sneezing and sore throat.    Eyes: Negative for pain and redness.   Respiratory: Positive for cough. Negative for choking, chest tightness and shortness of breath.    Cardiovascular: Negative for chest pain and palpitations.   Gastrointestinal: Negative for abdominal distention, abdominal pain, constipation, diarrhea, nausea and vomiting.   Endocrine: Negative for polydipsia and polyphagia.   Genitourinary: Negative for dysuria and hematuria.   Musculoskeletal: Negative for arthralgias, joint swelling and myalgias.   Skin: Negative for color change and pallor.   Neurological: Positive for headaches. Negative for dizziness and light-headedness.       Objective:       Vitals:    03/13/17 1428   BP: 118/72   Pulse: 102   Resp: 20   Temp: 98.7 °F (37.1 °C)   SpO2: 96%   Weight: 61.3 kg (135 lb 2.3 oz)   Height: 5' 6" (1.676 m)   PainSc: 0-No pain       Physical Exam   Constitutional: She is oriented to person, place, and time. She appears well-developed and well-nourished.   HENT:   Head: Normocephalic and atraumatic.   Right Ear: External ear normal. Tympanic membrane mobility " is abnormal.   Left Ear: External ear normal. Tympanic membrane mobility is abnormal.   Nose: Mucosal edema and rhinorrhea present.   Mouth/Throat: Posterior oropharyngeal edema present. No posterior oropharyngeal erythema.   Eyes: Conjunctivae are normal. Pupils are equal, round, and reactive to light.   Neck: Normal range of motion. Neck supple. No tracheal deviation present.   Cardiovascular: Normal rate and regular rhythm.  Exam reveals no gallop and no friction rub.    No murmur heard.  Pulmonary/Chest: Effort normal. No stridor. No respiratory distress. She has no wheezes. She has rhonchi in the left lower field. She has no rales.   Musculoskeletal: Normal range of motion.   Lymphadenopathy:     She has no cervical adenopathy.   Neurological: She is alert and oriented to person, place, and time.   Skin: Skin is warm and dry.   Psychiatric: She has a normal mood and affect. Her behavior is normal. Judgment and thought content normal.       Assessment:       1. Acute maxillary sinusitis, recurrence not specified    2. Cough    3. Fatigue, unspecified type    4. Pneumonia of left lower lobe due to infectious organism        Plan:       Alexsandra was seen today for cough and fever.    Diagnoses and all orders for this visit:    Acute maxillary sinusitis, recurrence not specified  -     triamcinolone acetonide injection 40 mg; Inject 1 mL (40 mg total) into the muscle one time.    Cough  -     X-Ray Chest PA And Lateral; Future    Fatigue, unspecified type  -     cyanocobalamin injection 1,000 mcg; Inject 1 mL (1,000 mcg total) into the muscle one time.    Pneumonia of left lower lobe due to infectious organism  -     levoFLOXacin (LEVAQUIN) 500 MG tablet; Take 1 tablet (500 mg total) by mouth once daily.  -     predniSONE (DELTASONE) 20 MG tablet; Take 1 tablet (20 mg total) by mouth once daily.

## 2017-03-13 NOTE — MR AVS SNAPSHOT
[facility]  [address] [address]  [facility] [address]  Phone: [phone]  Fax: [phone]                  Alexsandra Doe   3/13/2017 2:20 PM   Office Visit    Description:  Female : 1940   Provider:  Sirisha Wooten NP   Department:  [facility]           Reason for Visit     Cough     Fever           Diagnoses this Visit        Comments    Acute maxillary sinusitis, recurrence not specified    -  Primary     Cough         Fatigue, unspecified type                To Do List           Future Appointments        Provider Department Dept Phone    3/29/2017 8:30 AM LAB, [facility] [phone]    2017 8:30 AM LAB, [facility] [phone]      Goals (5 Years of Data)     None      Follow-Up and Disposition     Return if symptoms worsen or fail to improve.       These Medications        Disp Refills Start End    levoFLOXacin (LEVAQUIN) 500 MG tablet 7 tablet 0 3/13/2017 3/20/2017    Take 1 tablet (500 mg total) by mouth once daily. - Oral    Pharmacy: [facility] [address] [place], [place] [address] [place] [address] AT [facility] [address] Ph #: [phone]       predniSONE (DELTASONE) 20 MG tablet 10 tablet 0 3/13/2017 3/23/2017    Take 1 tablet (20 mg total) by mouth once daily. - Oral    Pharmacy: [facility] [id number] [place] [address] AT [facility] [address] Ph #: [phone]         [facility] On Call     [facility] On Call Nurse Care Line -  Assistance  Registered nurses in the [facility] On Call Center provide clinical advisement, health education, appointment booking, and other advisory services.  Call for this free service at [phone].             Medications           Message regarding Medications     Verify the changes and/or additions to your medication regime listed below are the same as discussed with your clinician  today.  If any of these changes or additions are incorrect, please notify your healthcare provider.        START taking these NEW medications        Refills    levoFLOXacin (LEVAQUIN) 500 MG tablet 0    Sig: Take 1 tablet (500 mg total) by mouth once daily.    Class: Normal    Route: Oral    predniSONE (DELTASONE) 20 MG tablet 0    Sig: Take 1 tablet (20 mg total) by mouth once daily.    Class: Normal    Route: Oral      These medications were administered today        Dose Freq    triamcinolone acetonide injection 40 mg 40 mg Clinic/HOD 1 time    Sig: Inject 1 mL (40 mg total) into the muscle one time.    Class: Normal    Route: Intramuscular    cyanocobalamin injection 1,000 mcg 1,000 mcg Clinic/HOD 1 time    Sig: Inject 1 mL (1,000 mcg total) into the muscle one time.    Class: Normal    Route: Intramuscular           Verify that the below list of medications is an accurate representation of the medications you are currently taking.  If none reported, the list may be blank. If incorrect, please contact your healthcare provider. Carry this list with you in case of emergency.           Current Medications     letrozole (FEMARA) 2.5 mg Tab Take 1 tablet (2.5 mg total) by mouth once daily.    palbociclib (IBRANCE) 125 mg Cap Take 125 mg by mouth Daily. TAKE ONE CAPSULE DAILY FOR 3 WEEKS ON, THEN ONE WEEK OFF    albuterol (ACCUNEB) 1.25 mg/3 mL Nebu Take 3 mLs (1.25 mg total) by nebulization every 6 (six) hours as needed. Rescue    fluticasone (FLONASE) 50 mcg/actuation nasal spray USE 2 SPRAYS IN EACH NOSTRIL ONCE DAILY.    gentamicin (GARAMYCIN) 0.1 % ointment Apply topically 3 (three) times daily.    levoFLOXacin (LEVAQUIN) 500 MG tablet Take 1 tablet (500 mg total) by mouth once daily.    poly-ureaurethane 16 % NFSo Apply 1 application topically once daily.    predniSONE (DELTASONE) 20 MG tablet Take 1 tablet (20 mg total) by mouth once daily.           Clinical Reference Information           Your Vitals Were      "BP Pulse Temp Resp Height Weight    118/72 102 98.7 °F (37.1 °C) 20 5' 6" (1.676 m) 61.3 kg (135 lb 2.3 oz)    Last Period SpO2 BMI          (LMP Unknown) 96% 21.81 kg/m2        Blood Pressure          Most Recent Value    BP  118/72      Allergies as of 3/13/2017     Adhesive    Allegra [Fexofenadine]    Cefuroxime Axetil    Cephalosporins    Ciprofloxacin    Clarinex [Desloratadine]    Clarithromycin    Doxycycline Calcium    Erythromycin    Levocetirizine    Meclizine    Moxifloxacin    Nitrofurantoin Monohyd/m-cryst    Omeprazole    Penicillins    Scopace [Scopolamine Hbr]    Sulfa (Sulfonamide Antibiotics)    Zyrtec-d [Cetirizine-pseudoephedrine]      Immunizations Administered on Date of Encounter - 3/13/2017     None      Orders Placed During Today's Visit     Future Labs/Procedures Expected by Expires    X-Ray Chest PA And Lateral  3/13/2017 3/13/2018      Administrations This Visit     cyanocobalamin injection 1,000 mcg     Admin Date Action Dose Route Administered By             03/13/2017 Given 1000 mcg Intramuscular Ursula Manzo LPN                    triamcinolone acetonide injection 40 mg     Admin Date Action Dose Route Administered By             03/13/2017 Given 40 mg Intramuscular Ursula Manzo LPN                      Language Assistance Services     ATTENTION: Language assistance services are available, free of charge. Please call [phone].      ATENCIÓN: Si habla tigre, tiene a lee disposición servicios gratuitos de asistencia lingüística. Llame al [phone].     CHÚ Ý: N?u b?n nói Ti?ng Vi?t, có các d?ch v? h? tr? ngôn ng? mi?n phí dành cho b?n. G?i s? [phone].         [facility] complies with applicable Federal civil rights laws and does not discriminate on the basis of race, color, national origin, age, disability, or sex.        "

## 2017-03-14 NOTE — TELEPHONE ENCOUNTER
Looks like her chest x-ray does show a left lower lobe pneumonia.  Take medication as prescribed and  we will need to doa follow-up chest x-ray in 4 weeks.

## 2017-03-15 NOTE — TELEPHONE ENCOUNTER
Spoke to pt and she verbalized understanding of xray and she stated she is taking the medication.  Pt was coughing while on the phone.  She stated the phlegm is breaking and coming up.  Pt stated she was still weak, but resting and moving around and hydrating.

## 2017-03-18 PROBLEM — D53.9 MACROCYTIC ANEMIA: Status: ACTIVE | Noted: 2017-01-01

## 2017-04-06 NOTE — TELEPHONE ENCOUNTER
No antibiotics needed at this time, I would like to see CT results ASAP, tell her to do this when she can

## 2017-04-06 NOTE — TELEPHONE ENCOUNTER
Pt returned call in regards to message below.Chest xray results. Instructed per VIDYA Alicia for chest xray. Pt states she does have a pulmonologist, Dr Singh and she has seen him and given him a copy of her Chest xray. Instructed pt that Dr Singh has a chest CT ordered, but pt is inquiring fi she needs to start taking antibiotic for the results. Pt states no fever, and still spitting up clear, foamy sputum. Please review and advise on pt starting antibiotic. Thank you. CLC

## 2017-04-06 NOTE — TELEPHONE ENCOUNTER
Phoned pt in regards to message below. Pt voiced understanding for instructions per VIDYA Alicia. Pt states pulmonologist is sending her something for the cough and that they are working on trying to get CT scheduled asap. CLC

## 2017-04-06 NOTE — TELEPHONE ENCOUNTER
The chest x-ray does not show much change on the left side since the initial pneumonia. Does she have a pulmonologist?

## 2017-04-26 PROBLEM — R59.1 LYMPHADENOPATHY: Status: ACTIVE | Noted: 2017-01-01

## 2017-05-02 NOTE — MR AVS SNAPSHOT
[facility]  [address] [address] [address]  [place] [address]  Phone: [phone]  Fax: [phone]                  Alexsandra Doe   2017 8:20 AM   Office Visit    Description:  Female : 1940   Provider:  Sirisha Wooten NP   Department:  [facility]           Reason for Visit     Flank Pain           Diagnoses this Visit        Comments    B12 deficiency    -  Primary            To Do List           Future Appointments        Provider Department Dept Phone    5/10/2017 8:45 AM [phone]      Goals (5 Years of Data)     None      [facility]     [facility] Nurse Care Line -  Assistance  Unless otherwise directed by your provider, please contact [facility], our nurse care line that is available for  assistance.     Registered nurses in the [facility] provide: appointment scheduling, clinical advisement, health education, and other advisory services.  Call: [phone] (toll free)               Medications           Message regarding Medications     Verify the changes and/or additions to your medication regime listed below are the same as discussed with your clinician today.  If any of these changes or additions are incorrect, please notify your healthcare provider.        These medications were administered today        Dose Freq    cyanocobalamin injection 1,000 mcg 1,000 mcg Clinic/HOD 1 time    Sig: Inject 1 mL (1,000 mcg total) into the muscle one time.    Class: Normal    Route: Intramuscular      STOP taking these medications     cyanocobalamin 1,000 mcg/mL injection Inject 1 mL (1,000 mcg total) into the skin every 30 days.           Verify that the below list of medications is an accurate representation of the medications you are currently taking.  If none reported, the list may be blank. If incorrect, please contact your healthcare provider. Carry this list  "with you in case of emergency.           Current Medications     fluticasone (FLONASE) 50 mcg/actuation nasal spray USE 2 SPRAYS IN EACH NOSTRIL ONCE DAILY.    letrozole (FEMARA) 2.5 mg Tab Take 1 tablet (2.5 mg total) by mouth once daily.    budesonide (PULMICORT) 0.25 mg/2 mL nebulizer solution 1 mL.    palbociclib (IBRANCE) 125 mg Cap Take 125 mg by mouth Daily. TAKE ONE CAPSULE DAILY FOR 3 WEEKS ON, THEN ONE WEEK OFF    sodium chloride 7% 7 % nebulizer solution VVN BID FOR 15 DAYS.           Clinical Reference Information           Your Vitals Were     BP Pulse Temp Height Weight Last Period    112/72 (BP Location: Right arm, Patient Position: Sitting, BP Method: Manual) 95 98.1 °F (36.7 °C) (Oral) 5' 4" (1.626 m) 59.4 kg (130 lb 13.5 oz) (LMP Unknown)    SpO2 BMI             96% 22.46 kg/m2         Blood Pressure          Most Recent Value    BP  112/72      Allergies as of 5/2/2017     Adhesive    Allegra [Fexofenadine]    Cefuroxime Axetil    Cephalosporins    Clarinex [Desloratadine]    Clarithromycin    Doxycycline Calcium    Erythromycin    Levocetirizine    Meclizine    Moxifloxacin    Nitrofurantoin Monohyd/m-cryst    Omeprazole    Penicillins    Scopace [Scopolamine Hbr]    Sulfa (Sulfonamide Antibiotics)    Zyrtec-d [Cetirizine-pseudoephedrine]      Immunizations Administered on Date of Encounter - 5/2/2017     None      Administrations This Visit     cyanocobalamin injection 1,000 mcg     Admin Date Action Dose Route Administered By             05/02/2017 Given 1000 mcg Intramuscular Jennifer Correa LPN                      Language Assistance Services     ATTENTION: Language assistance services are available, free of charge. Please call [phone].      ATENCIÓN: Si habla kierraañol, tiene a lee disposición servicios gratuitos de asistencia lingüística. Llame al [phone].     CHÚ Ý: N?u b?n nói Ti?ng Vi?t, có các d?ch v? h? tr? ngôn ng? mi?n phí dành cho b?n. G?i s? [phone].         " [facility] complies with applicable Federal civil rights laws and does not discriminate on the basis of race, color, national origin, age, disability, or sex.

## 2017-05-06 NOTE — PROGRESS NOTES
"Subjective:       Patient ID: Alexsandra Doe is a 76 y.o. female.    Chief Complaint: Flank Pain (States thinks she has a kidney infection. Needs a B12 shot)    HPI Comments: The patient wanted to make sure she did not have a urinary tract infection. 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  She is followed by Dr. Singh in pulmonology as well as Dr Vargas in hematology/oncology.  She is requesting a B12 level because she is feeling so run down.    Flank Pain   This is a new problem. The current episode started in the past 7 days. The problem occurs intermittently. The pain is present in the costovertebral angle. Pertinent negatives include no abdominal pain, bladder incontinence, bowel incontinence, chest pain, dysuria, fever, headaches, leg pain, numbness, paresis, paresthesias, pelvic pain, perianal numbness, tingling, weakness or weight loss.     Review of Systems   Constitutional: Negative for appetite change, chills, fever and weight loss.   HENT: Negative for ear pain and postnasal drip.    Eyes: Negative for pain and itching.   Respiratory: Negative for chest tightness and shortness of breath.    Cardiovascular: Negative for chest pain.   Gastrointestinal: Negative for abdominal distention, abdominal pain and bowel incontinence.   Endocrine: Negative for polydipsia and polyuria.   Genitourinary: Positive for flank pain. Negative for bladder incontinence, difficulty urinating, dysuria and pelvic pain.   Skin: Negative for color change and pallor.   Neurological: Negative for tingling, weakness, light-headedness, numbness, headaches and paresthesias.   Hematological: Negative for adenopathy. Does not bruise/bleed easily.   Psychiatric/Behavioral: Negative for agitation.       Objective:       Vitals:    05/02/17 0825   BP: 112/72   Pulse: 95   Temp: 98.1 °F (36.7 °C)   TempSrc: Oral   SpO2: 96%   Weight: 59.4 kg (130 lb 13.5 oz)   Height: 5' 4" (1.626 m)   PainSc: 0-No pain       Physical Exam "   Constitutional: She is oriented to person, place, and time. She appears well-developed and well-nourished.   HENT:   Head: Normocephalic and atraumatic.   Right Ear: External ear normal.   Left Ear: External ear normal.   Nose: Nose normal.   Mouth/Throat: Oropharynx is clear and moist.   Eyes: Conjunctivae are normal. Right eye exhibits no discharge. Left eye exhibits no discharge. No scleral icterus.   Neck: Normal range of motion. Neck supple. No tracheal deviation present.   Cardiovascular: Normal rate, regular rhythm and normal heart sounds.  Exam reveals no friction rub.    No murmur heard.  Pulmonary/Chest: Effort normal and breath sounds normal. No stridor. No respiratory distress. She has no wheezes. She has no rales. She exhibits no tenderness.   Musculoskeletal: Normal range of motion.   Lymphadenopathy:     She has no cervical adenopathy.   Neurological: She is alert and oriented to person, place, and time.   Skin: Skin is warm and dry.   Psychiatric: She has a normal mood and affect.       Assessment:       1. UTI symptoms    2. B12 deficiency        Plan:       Alexsandra was seen today for flank pain.    Diagnoses and all orders for this visit:    UTI symptoms  -     POCT URINE DIPSTICK WITHOUT MICROSCOPE  Results for orders placed or performed in visit on 05/02/17   POCT URINE DIPSTICK WITHOUT MICROSCOPE   Result Value Ref Range    Color, UA yellow     Spec Grav UA 1.015     pH, UA 6     WBC, UA -     Nitrite, UA -     Protein -     Glucose, UA norm     Ketones, UA -     Urobilinogen, UA norm     Bilirubin +     Blood, UA -        Reassurance that the urine does not show infection today.      B12 deficiency  -     cyanocobalamin injection 1,000 mcg; Inject 1 mL (1,000 mcg total) into the muscle one time.

## 2017-05-23 NOTE — PLAN OF CARE
Problem: Patient Care Overview (Adult)  Goal: Individualization & Mutuality  Outcome: Ongoing (interventions implemented as appropriate)  TOLERATED TREATMENT WITHOUT DIFFICULTY.

## 2017-06-06 NOTE — TELEPHONE ENCOUNTER
----- Message from Mary Kay Hoover sent at 6/5/2017 11:51 AM CDT -----  Patient got a letter from [employer] stating that they will not cover a blood test that was done on 5/16/2017 because she didn't have a referral - copy of insurance letter is in the bin up front...she can appeal  Callback # [phone]

## 2017-06-09 PROBLEM — J90 PLEURAL EFFUSION: Status: ACTIVE | Noted: 2017-01-01

## 2017-06-15 NOTE — TELEPHONE ENCOUNTER
----- Message from Marc Hauser sent at 6/14/2017 12:05 PM CDT -----  Contact: Kenia lee/Dr Mayte Aquino is requesting a callback, needs a referral for pt to get labs  Call Back#  Thanks

## 2017-06-19 PROBLEM — J90 PLEURISY WITH EFFUSION, WITH MENTION OF BACTERIAL CAUSE OTHER THAN TUBERCULOSIS: Status: ACTIVE | Noted: 2017-01-01

## 2017-06-19 PROBLEM — B96.89 PLEURISY WITH EFFUSION, WITH MENTION OF BACTERIAL CAUSE OTHER THAN TUBERCULOSIS: Status: ACTIVE | Noted: 2017-01-01

## 2017-06-19 NOTE — TELEPHONE ENCOUNTER
Carlota from Dr. Perri Velasquez called. States pt's labs are not being covered by her insurance. We need a referral to Dr. Velasquez in order for pt's labs to be covered. Please advise. ADIS Madera LPN

## 2017-06-21 NOTE — TELEPHONE ENCOUNTER
Rtc to Dr Mayte Velasquez office-for Yessenia at [phone] and left v/m that I Ivette with [facility] rtc call, I did not originate message but please rtc to see if I can help. cm

## 2017-07-11 NOTE — TELEPHONE ENCOUNTER
----- Message from Meronnathan Felix sent at 7/10/2017  3:35 PM CDT -----  Patient states that she need to see the doctor/NP as soon as possible for stomach issued and numbness in finger and leg.  Please call patient at [phone].

## 2017-07-11 NOTE — TELEPHONE ENCOUNTER
Patient states she has been having issues with gas, will try OTC suggestion made by oncologist. Patient requested sooner appt with provider to discuss issues, OV scheduled, patient aware.

## 2017-07-11 NOTE — TELEPHONE ENCOUNTER
----- Message from Komal Valentin sent at 7/11/2017 10:48 AM CDT -----  Contact: Patient  Alexsandra, patient [phone], calling to speak with the nurse regarding extreme gas. Please advise. Thanks.

## 2017-07-13 NOTE — PROGRESS NOTES
"Subjective:       Patient ID: Alexsandra Doe is a 76 y.o. female.    Chief Complaint: Abdominal Pain (C/o decreased appetiete, gas pain and nausea.)    The patient is here to discuss recent chest pain, decreased appetite and nausea. 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 She has been on letrozole and Ibrance since February 2016.  She now has a pleurx catheter that is draining regularly by home health nurse.  She is now on home oxygen at 2 L per nasal cannula.  She tells me that she has been offered hospice care but she is unsure if she wants to proceed with this and still has questions about what would happen if she accepted this.       Review of Systems   Constitutional: Positive for activity change and appetite change. Negative for chills and fever.   HENT: Positive for postnasal drip and rhinorrhea. Negative for ear pain.    Eyes: Negative for pain and itching.   Respiratory: Positive for shortness of breath. Negative for chest tightness.    Gastrointestinal: Positive for abdominal pain and nausea. Negative for abdominal distention.   Endocrine: Negative for polydipsia and polyuria.   Genitourinary: Negative for difficulty urinating and flank pain.   Skin: Negative for color change and pallor.   Neurological: Negative for light-headedness and headaches.   Hematological: Negative for adenopathy. Does not bruise/bleed easily.   Psychiatric/Behavioral: Negative for agitation.       Objective:       Vitals:    07/13/17 1544   BP: (!) 102/58   Pulse: 100   Resp: 16   Temp: 98.3 °F (36.8 °C)   SpO2: 98%   Weight: 58.8 kg (129 lb 11.9 oz)   Height: 5' 5" (1.651 m)   PainSc: 0-No pain       Physical Exam   Constitutional: She is oriented to person, place, and time. She appears well-developed.   Pale female on 2 L nasal cannula of oxygen.   HENT:   Head: Normocephalic and atraumatic.   Right Ear: External ear normal. Tympanic membrane mobility is abnormal.   Left " Ear: External ear normal. Tympanic membrane mobility is abnormal.   Nose: Mucosal edema and rhinorrhea present.   Mouth/Throat: Posterior oropharyngeal edema present. No posterior oropharyngeal erythema.   Eyes: Conjunctivae are normal. Pupils are equal, round, and reactive to light.   Neck: Normal range of motion. Neck supple. No tracheal deviation present.   Cardiovascular: Normal rate and regular rhythm.  Exam reveals no gallop and no friction rub.    No murmur heard.  Pulmonary/Chest: Effort normal and breath sounds normal. No stridor. No respiratory distress. She has no wheezes. She has no rales.   Musculoskeletal: Normal range of motion.   Lymphadenopathy:     She has no cervical adenopathy.   Neurological: She is alert and oriented to person, place, and time.   Skin: Skin is warm and dry.   Dressing over the right chest wall dry and intact.   Psychiatric: She has a normal mood and affect. Her behavior is normal. Judgment and thought content normal.       Assessment:       1. Breast carcinoma metastatic, pleura, left    2. B12 deficiency        Plan:       Alexsandra was seen today for abdominal pain.    Diagnoses and all orders for this visit:    Breast carcinoma metastatic, pleura, left  Greater than 50% of the patient's 40 minute clinic visit was spent on counseling on end of life issues and hospice care.     She was given a scopolamine patch to try to help with nausea and I did recommend that she start this as soon as possible.  I also recommended Gas-X for gas pain.      B12 deficiency  -    Patient request a B12 injection today.  -     cyanocobalamin injection 1000 mcg; Inject 0.1 mLs (100 mcg total) into the muscle one time.